# Patient Record
Sex: FEMALE | Race: OTHER | HISPANIC OR LATINO | URBAN - METROPOLITAN AREA
[De-identification: names, ages, dates, MRNs, and addresses within clinical notes are randomized per-mention and may not be internally consistent; named-entity substitution may affect disease eponyms.]

---

## 2019-10-24 ENCOUNTER — EMERGENCY (EMERGENCY)
Facility: HOSPITAL | Age: 54
LOS: 1 days | Discharge: HOME | End: 2019-10-24
Attending: EMERGENCY MEDICINE
Payer: MEDICAID

## 2019-10-24 VITALS
WEIGHT: 139.99 LBS | SYSTOLIC BLOOD PRESSURE: 115 MMHG | TEMPERATURE: 98 F | DIASTOLIC BLOOD PRESSURE: 73 MMHG | HEIGHT: 65 IN | HEART RATE: 100 BPM | OXYGEN SATURATION: 97 % | RESPIRATION RATE: 19 BRPM

## 2019-10-24 DIAGNOSIS — Z98.890 OTHER SPECIFIED POSTPROCEDURAL STATES: Chronic | ICD-10-CM

## 2019-10-24 PROCEDURE — 99282 EMERGENCY DEPT VISIT SF MDM: CPT

## 2019-10-24 PROCEDURE — 99283 EMERGENCY DEPT VISIT LOW MDM: CPT

## 2019-10-24 NOTE — ED PROVIDER NOTE - CLINICAL SUMMARY MEDICAL DECISION MAKING FREE TEXT BOX
Pt is a 54y F presenting to the ED for nasal congestion and difficulty breathing through the nose after having recent nasal surgery. Symptoms exam appear normal after surgery. Discussed with Dr. Middleton office at [facility] to arrange appointment for tomorrow. Pt agrees to f/u with Dr. Middleton tomorrow. Pt is a 54y F presenting to the ED for nasal congestion and difficulty breathing through the nose after having recent nasal surgery. Symptoms and exam appear normal post op. Discussed with Dr. Middleton office at [facility] to arrange appointment for tomorrow. Pt agrees to f/u with Dr. Middleton tomorrow 11:30, return sooner to ED for any issues.

## 2019-10-24 NOTE — ED ADULT NURSE NOTE - OBJECTIVE STATEMENT
Pt came in for c/o difficulty breathing, pt is s/p nasal surgery on 10/18/19. pt is speaking clearly, breathing unlabored, speech is clear & full sentences. Denies any other distress. NAD.

## 2019-10-24 NOTE — ED PROVIDER NOTE - PATIENT PORTAL LINK FT
You can access the FollowMyHealth Patient Portal offered by [facility] by registering at the following website: [email or web address]. By joining [employer]’s FollowMyHealth portal, you will also be able to view your health information using other applications (apps) compatible with our system.

## 2019-10-24 NOTE — ED PROVIDER NOTE - NS HIV RISK FACTOR YES
Interval History: Tolerating Ta.    Review of Systems   Constitutional: Negative.    HENT: Negative.    Eyes: Negative.    Respiratory: Negative for cough, chest tightness and shortness of breath.    Cardiovascular: Negative for chest pain.   Gastrointestinal: Negative.  Negative for constipation, diarrhea and nausea.   Genitourinary: Negative for flank pain.   Musculoskeletal: Negative.    Neurological: Negative.    Psychiatric/Behavioral: Negative.      Objective:     Temp:  [96.3 °F (35.7 °C)-98.6 °F (37 °C)] 98.4 °F (36.9 °C)  Pulse:  [63-91] 87  Resp:  [16-18] 17  SpO2:  [90 %-97 %] 97 %  BP: (141-172)/(63-74) 145/65     Body mass index is 21.95 kg/m².            Drains     Drain                 Urethral Catheter 08/28/18 0948 1 day                Physical Exam   Nursing note and vitals reviewed.  Constitutional: She is oriented to person, place, and time. She appears well-developed.   HENT:   Head: Normocephalic.   Eyes: Conjunctivae are normal.   Neck: Normal range of motion. No tracheal deviation present. No thyromegaly present.   Cardiovascular: Normal rate, normal heart sounds and normal pulses.    Pulmonary/Chest: Effort normal and breath sounds normal. No respiratory distress. She has no wheezes.   Abdominal: Soft. She exhibits no distension and no mass. There is no hepatosplenomegaly. There is no tenderness. There is no rebound, no guarding and no CVA tenderness. No hernia.   Musculoskeletal: Normal range of motion. She exhibits no edema or tenderness.   Lymphadenopathy:     She has no cervical adenopathy.   Neurological: She is alert and oriented to person, place, and time.   Skin: Skin is warm and dry. No rash noted. No erythema.     Psychiatric: She has a normal mood and affect. Her behavior is normal. Judgment and thought content normal.       Significant Labs:    BMP:  Recent Labs   Lab  08/27/18   1816   NA  132*   K  3.5   CL  96   CO2  22*   BUN  20   CREATININE  0.8   CALCIUM  10.0        CBC:   Recent Labs   Lab  08/27/18   1816  08/28/18   0751  08/29/18   0442   WBC  19.88*  12.74*  11.15   HGB  13.4  12.7  13.2   HCT  39.9  37.6  37.5   PLT  363*  343  308       Blood Culture: No results for input(s): LABBLOO in the last 168 hours.  Urine Culture:   Recent Labs   Lab  08/28/18   0943   LABURIN  No growth to date       Significant Imaging:  Renal ultrasound:  Reading Physician Reading Date Result Priority   Connor Leyva MD 8/29/2018       Narrative     EXAMINATION:  US RETROPERITONEAL COMPLETE    CLINICAL HISTORY:  right hydronephrosis, now with Ta;    TECHNIQUE:  Ultrasound of the kidneys and urinary bladder was performed including color flow and Doppler evaluation of the kidneys.    COMPARISON:  None.    FINDINGS:  The right kidney is normal in length measuring 10.0 cm. The left kidney is slightly malrotated and difficult to measure in length, roughly 9.4 cm. Segmental arterial resistive indices are within normal limits.  No hydronephrosis or renal masses identified.  The bladder is decompressed.      Impression       No hydronephrosis.      Electronically signed by: Connor Leyva MD  Date: 08/29/2018  Time: 10:12                    Declined

## 2019-10-24 NOTE — ED ADULT NURSE NOTE - NSIMPLEMENTINTERV_GEN_ALL_ED
Implemented All Universal Safety Interventions:  [place] to call system. Call bell, personal items and telephone within reach. Instruct patient to call for assistance. Room bathroom lighting operational. Non-slip footwear when patient is off stretcher. Physically safe environment: no spills, clutter or unnecessary equipment. Stretcher in lowest position, wheels locked, appropriate side rails in place.

## 2019-10-24 NOTE — ED PROVIDER NOTE - OBJECTIVE STATEMENT
Pt is a 54y F with no significant PMHx and significant PSHx of nasal surgery (septoplasty, turbinectomy, rhinoplasty, nasal valve repair) on 10/18 presenting to the ED for nasal congestion since surgery. Surgery performed Dr. Middleton at [facility] (same day surgery). Pt was seen on Monday at the office and was told everything was normal. Pt has been utilizing a saline spray to some relief. Pt denies any fever, no pain, no bleeding, no difficulty breathing through mouth. Symptoms are exclusively due to nasal obstruction. Pt has NKDA and currently denies any additional complaints at this time. Pt is a 54y F with no significant PMHx and significant PSHx of nasal surgery (septoplasty, turbinectomy, rhinoplasty, nasal valve repair) on 10/18 presenting to the ED for nasal congestion since surgery. Surgery performed Dr. Middleton at [facility] (same day surgery). Pt was seen on Monday at the office and was told everything was normal. Pt has been utilizing a saline spray to some relief. Pt denies any fever, no pain, no bleeding, no difficulty breathing through mouth. No cp, dizziness or HA. Pt has NKDA and currently denies any additional complaints at this time.

## 2019-10-24 NOTE — ED PROVIDER NOTE - NOSE FINDINGS
Stitches to bilateral nares that are clean, dry and intact. Post-op changes to bilateral nostrils. No active bleeding and no purulent discharge.

## 2019-10-24 NOTE — ED ADULT NURSE NOTE - CHPI ED NUR SYMPTOMS NEG
no headache/no shortness of breath/no edema/no cough/no chest pain/no chills/no hemoptysis/no fever/no wheezing/no body aches/no diaphoresis

## 2019-12-06 ENCOUNTER — EMERGENCY (EMERGENCY)
Facility: HOSPITAL | Age: 54
LOS: 1 days | Discharge: HOME | End: 2019-12-06
Attending: EMERGENCY MEDICINE
Payer: MEDICAID

## 2019-12-06 VITALS
DIASTOLIC BLOOD PRESSURE: 76 MMHG | RESPIRATION RATE: 16 BRPM | TEMPERATURE: 98 F | WEIGHT: 139.99 LBS | HEIGHT: 65 IN | OXYGEN SATURATION: 98 % | HEART RATE: 82 BPM | SYSTOLIC BLOOD PRESSURE: 112 MMHG

## 2019-12-06 DIAGNOSIS — Z98.890 OTHER SPECIFIED POSTPROCEDURAL STATES: Chronic | ICD-10-CM

## 2019-12-06 PROBLEM — Z78.9 OTHER SPECIFIED HEALTH STATUS: Chronic | Status: ACTIVE | Noted: 2019-10-24

## 2019-12-06 PROCEDURE — 99283 EMERGENCY DEPT VISIT LOW MDM: CPT

## 2019-12-06 RX ORDER — PSEUDOEPHEDRINE HCL 30 MG
1 TABLET ORAL
Qty: 20 | Refills: 0
Start: 2019-12-06

## 2019-12-06 NOTE — ED ADULT NURSE NOTE - ED STAT RN HANDOFF DETAILS
Patient discharged home as per MD order. All discharge instructions and F/U visits provided to patient. Patient verbalizes understanding leaving ambulatory in no acute distress.

## 2019-12-06 NOTE — ED ADULT NURSE NOTE - CHPI ED NUR SYMPTOMS NEG
no deformity/no fever/no numbness/no tingling/no stiffness/no difficulty bearing weight/no abrasion/no weakness

## 2019-12-06 NOTE — ED PROVIDER NOTE - OBJECTIVE STATEMENT
Patient opted for : Dilma [id number]. Patient is a 55 y/o female with no significant PMHx/PSHx c/o intermittent R ear pain since last night that is described as a throbbing sensation followed by a ringing sensation and is aggravated when eating. Patient reports she recently had nasal surgery and still has some swelling from the surgery causing congestion. Patient additionally notes R arm pain for several days which she has not taken medications for. Patient denies fevers, chills, sweats, drainage, bleeding, discharge, chest pain, sob, or any other acute complaints.

## 2019-12-06 NOTE — ED PROVIDER NOTE - CLINICAL SUMMARY MEDICAL DECISION MAKING FREE TEXT BOX
53 y/o female presents with R ear pain possibly due to hx of nasal surgery and congestion. Will provide decongestant, ENT f/u for patient, and discharge.

## 2019-12-06 NOTE — ED PROVIDER NOTE - PATIENT PORTAL LINK FT
You can access the FollowMyHealth Patient Portal offered by [facility] by registering at the following website: [email or web address]. By joining [employer]’s FollowMyHealth portal, you will also be able to view your health information using other applications (apps) compatible with our system.

## 2019-12-06 NOTE — ED ADULT NURSE NOTE - OBJECTIVE STATEMENT
Patient present to ED with c/o right ear pain since last night and Right arm pain for the past 2-3 days on pain scale 8/10

## 2020-04-01 ENCOUNTER — EMERGENCY (EMERGENCY)
Facility: HOSPITAL | Age: 55
LOS: 1 days | Discharge: HOME | End: 2020-04-01
Attending: EMERGENCY MEDICINE
Payer: MEDICAID

## 2020-04-01 VITALS
HEART RATE: 83 BPM | RESPIRATION RATE: 16 BRPM | TEMPERATURE: 98 F | DIASTOLIC BLOOD PRESSURE: 72 MMHG | HEIGHT: 65 IN | SYSTOLIC BLOOD PRESSURE: 107 MMHG | OXYGEN SATURATION: 97 % | WEIGHT: 145.95 LBS

## 2020-04-01 DIAGNOSIS — Z98.890 OTHER SPECIFIED POSTPROCEDURAL STATES: Chronic | ICD-10-CM

## 2020-04-01 PROCEDURE — 99283 EMERGENCY DEPT VISIT LOW MDM: CPT

## 2020-04-01 PROCEDURE — 87635 SARS-COV-2 COVID-19 AMP PRB: CPT

## 2020-04-01 RX ORDER — ACETAMINOPHEN WITH CODEINE 300MG-30MG
1 TABLET ORAL
Qty: 12 | Refills: 0
Start: 2020-04-01 | End: 2020-04-03

## 2020-04-01 RX ORDER — ALBUTEROL 90 UG/1
2 AEROSOL, METERED ORAL
Qty: 1 | Refills: 0
Start: 2020-04-01 | End: 2020-04-30

## 2020-04-01 NOTE — ED PROVIDER NOTE - NSFOLLOWUPINSTRUCTIONS_ED_ALL_ED_FT
Hoy puede o no dirk sido examinado para detectar el virus COVID-19. Tendrá que aislarse bria los próximos _____ días y luego puede salir del aislamiento siempre y cuando tenga 3 días sin fiebre (sin david ningún medicamento para la fiebre).    Para el dolor o la fiebre, puede david: Tylenol 1000 mg por vía oral cada 6 horas, según sea necesario. (Vitor 4000 mg en 24 horas).    Mantente corrine hidratado bebiendo mucha agua todos los días.    ** Regrese a la maribeth de urgencias si comienza a tener dificultad para respirar, si yuridia síntomas empeoran o si le preocupa. De lo contrario, quédese en casa y aísle.    ** Si tiene amigos o familiares que tienen síntomas leves que pueden deberse al virus, dígales que se queden en casa    Quédese en casa: las personas que están levemente enfermas con COVID-19 pueden recuperarse en casa. No se vaya, excepto para obtener atención médica. No visitar áreas públicas.  Manténgase en contacto con anand médico. Llame antes de recibir atención médica. Asegúrese de recibir atención si se siente peor o si cori que es brenden emergencia.  Evite el transporte público: evite usar el transporte público, el transporte compartido o los taxis.  Manténgase alejado de los demás: tanto mame sea posible, debe permanecer en brenden "habitación para enfermos" específica y lejos de otras personas en anand hogar. Use un baño separado, si está disponible.  Limite el contacto con mascotas y animales: debe restringir el contacto con mascotas y otros animales, ritu mame lo haría con otras personas.  Aunque no pl[place]ett habido informes de mascotas u otros animales que se enfermen con COVID-19, aún se recomienda que las personas con el virus limiten el contacto con los animales hasta que se conozca más información.  Si está enfermo: debe usar brenden mascarilla cuando esté cerca de otras personas y antes de ingresar al consultorio de un proveedor de atención médica.  Si está cuidando a otros: si la persona enferma no puede usar brenden máscara facial (por ejemplo, porque causa problemas para respirar), las personas que viven en el hogar deben permanecer en brenden habitación diferente. Cuando los cuidadores ingresan a la habitación de la persona enferma, deben usar brenden máscara facial. No se recomiendan visitantes, aparte de los cuidadores.  Cubierta: Cubra anand boca y nariz con un pañuelo cuando tosa o estornude.  Eliminar: tirar los pañuelos usados ??en un bote de basura forrado.  Lávese las sharron: Lávese las sharron inmediatamente con agua y jabón bria al menos 20 segundos. Si no hay agua y jabón disponibles, lávese las sharron con un desinfectante para sharron a base de alcohol que contenga al menos 60% de alcohol.  Desinfectante para sharron: si no hay agua y jabón disponibles, use un desinfectante para sharron a base de alcohol con al menos 60% de alcohol, cubriendo todas las superficies de yuridia sharron y frotándolas hasta que se sientan secas.  Jabón y agua: el jabón y el agua son la mejor opción, especialmente si las sharron están visiblemente sucias.  Evite tocar: evite tocarse los ojos, la nariz y la boca con las sharron sin radha.  No comparta: No comparta platos, vasos, vasos, utensilios para comer, toallas o ropa de cama con otras personas en anand hogar.  Lávese corrine después del uso: después de usar estos artículos, lávelos corrine con agua y jabón o póngalos en el lavavajias.  Limpie y desinfecte: Rutinariamente limpie las superficies de alto contacto en anand "habitación para enfermos" y baño. Deje que otra persona limpie y desinfecte las superficies en las áreas comunes, jaida no en anand habitación y baño.  Si un cuidador u otra persona necesita limpiar y desinfectar la habitación o el baño de brenden persona enferma, debe hacerlo según sea necesario. El cuidador / otra persona debe usar brenden máscara y esperar el mayor tiempo posible después de que la persona enferma haya usado el baño.  Las superficies de alto contacto incluyen teléfonos, controles remotos, mostradores, mesas, pomos de letty, accesorios de baño, inodoros, teclados, tabletas y mesitas de noche.  Limpie y desinfecte las áreas que pueden tener hoa, heces o fluidos corporales.  Busque atención médica, jaida llame norma: busque atención médica de inmediato si anand enfermedad está empeorando (por ejemplo, si tiene dificultad para respirar).  Llame a anand médico antes de ingresar: Antes de ir al consultorio del médico o la maribeth de emergencias, llame con anticipación y dígales yuridia síntomas. Te dirán qué hacer.  Use brenden máscara facial: si es posible, póngase brenden máscara facial antes de ingresar al edificio. Si no puede ponerse brenden máscara facial, trate de mantener brenden distancia murry de otras personas (al menos a 6 pies de distancia). [place] ayudará a proteger a las personas en la oficina o en la maribeth de espera.  Siga las instrucciones de cuidado de anand proveedor de atención médica y el departamento de lois local: las autoridades de lois locales le darán instrucciones sobre cómo controlar yuridia síntomas y reportar información.  Llame al 911 si tiene brenden emergencia médica: si tiene brenden emergencia médica y necesita llamar al 911, notifique al operador que tiene o cori que podría tener COVID-19. Si es posible, póngase brenden mascarilla antes de que llegue la ayuda médica.

## 2020-04-01 NOTE — ED PROVIDER NOTE - OBJECTIVE STATEMENT
56 y/o F patient w/ no significant PMHx presents to the ED with cough. Patient denies SOB and any other complaints. Patient is requesting COVID testing. NKDA.

## 2020-04-01 NOTE — ED PROVIDER NOTE - PATIENT PORTAL LINK FT
You can access the FollowMyHealth Patient Portal offered by [facility] by registering at the following website: [email or web address]. By joining [employer]’s FollowMyHealth portal, you will also be able to view your health information using other applications (apps) compatible with our system.

## 2020-04-02 LAB — SARS-COV-2 RNA SPEC QL NAA+PROBE: DETECTED

## 2022-04-27 NOTE — ED PROVIDER NOTE - NSCAREINITIATED _GEN_ER
Caller: Mendy Woods    Relationship: Self    Best call back number: [phone]    What is the medical concern/diagnosis: DIABETES    What specialty or service is being requested: DIETICIAN    Any additional details: PATIENT STATES SHE HAD A PREVIOUS REFERRAL  TO A DIETICIAN, BUT WAS TOLD THEY WOULD NEED A NEW REFERRAL IN ORDER TO SCHEDULE FURTHER APPOINTMENTS. PATIENT WOULD LIKE TO GO BACK TO THE DIETICIAN IN ORDER TO MANAGE DIABETES PLEASE CALL AND ADVISE      
Order placed  
Spoke to patient and she understood new referral was placed.  
Kavon Boyd(Attending)

## 2023-01-03 NOTE — ED PROVIDER NOTE - CPE EDP MUSC NORM
Use of eyelid scrubs encouraged. Recommend Optase TTO wipes. Wrap the moist cloth over your index finger. With your eyes closed, gently scrub where the eyelashes enter the eyelids. Flip the cloth over, rewrap around your index finger, and repeat this process on the other eye. With remaining moisture can use on T-zone of the face. normal...

## 2024-01-10 NOTE — ED ADULT NURSE NOTE - NS ED NURSE RECORD ANOTHER VITAL SIGN
Cass Chapa MD  Authorized by: Cass Chapa Yes
